# Patient Record
Sex: FEMALE | Race: WHITE
[De-identification: names, ages, dates, MRNs, and addresses within clinical notes are randomized per-mention and may not be internally consistent; named-entity substitution may affect disease eponyms.]

---

## 2019-07-11 NOTE — ULT
THYROID ULTRASOUND:

 

INDICATION: 

Followup nodules.

 

The patient is post right thyroidectomy.

 

FINDINGS: 

The left lobe is homogeneous.  There is an oblong-shaped relatively circumscribed isoechoic nodule mi
d to upper left lobe measuring 1.8 x 0.8 x 1.3 cm today.  This nodule has enlarged slightly since the
 prior exam at which time it measured approximately 0.6 x 1.4 cm.

 

In the mid to lower pole of the left lobe today is seen a slightly heterogeneous isoechoic nodule ion
suring 1.0 x 1.1 x 1.6 cm.  This nodule appears slightly different from the prior exam.  It was descr
ibed as cystic on the prior exam; however, a review of the prior study shows that the cyst was a comp
onent of this larger nodule which has not significantly changed since size but has changed in charact
er.  No significant cystic component is seen today.

 

Another tiny nodule measuring in the 6-8 mm range is identified in the medial lower pole.  

 

IMPRESSION: 

Slight enlargement of oblong-shaped nodule in the mid to upper pole of the left lobe.  A lower pole n
odule has changed in character today without significant change in size.  

 

POS: [facility]

## 2019-07-31 NOTE — ULT
Sonographic guided fine needle aspiration left thyroid lobe mass x2.



HISTORY: 2 dominant left thyroid lobe nodules.



FINDINGS: After explaining the procedure and answering all questions, sonographic survey confirms hyp
oechoic nodules within the left thyroid lobe.



Sterile technique, buffered local anesthesia, sonographic guidance, and a medial approach were used t
o carefully advance the tip of a 25-gauge needle into the oval mass at the superior pole of the

left thyroid lobe. Tissue was submitted to pathology for processing. A total of 4 passes were made.



A separate approach, also medial, was planned for the inferior pole nodule. Sterile technique, buffer
ed local anesthesia, sonographic guidance, and a medial approach were used to advance a 25-gauge

needle into the nodule at the inferior pole of the left thyroid lobe. Position was confirmed with son
ography. A total of 4 passes were made, with the tissue submitted to pathology for processing.



Postprocedure imaging shows no evidence of complication. Patient tolerated the procedure well and was
 dismissed in good condition.







IMPRESSION: Technically successful sonographic guided FNA sampling of 2 separate left thyroid lobe ma
sses. Pathology is pending.



Reported By: MELINDA Mcdowell 

Electronically Signed:  7/31/2019 4:45 PM

## 2021-10-18 ENCOUNTER — HOSPITAL ENCOUNTER (OUTPATIENT)
Dept: HOSPITAL 92 - BICMAMMO | Age: 46
Discharge: HOME | End: 2021-10-18
Attending: ADVANCED PRACTICE MIDWIFE
Payer: COMMERCIAL

## 2021-10-18 DIAGNOSIS — N63.15: Primary | ICD-10-CM

## 2021-10-18 DIAGNOSIS — N63.21: ICD-10-CM

## 2021-10-18 PROCEDURE — 77066 DX MAMMO INCL CAD BI: CPT

## 2021-10-18 PROCEDURE — G0279 TOMOSYNTHESIS, MAMMO: HCPCS

## 2022-02-24 ENCOUNTER — HOSPITAL ENCOUNTER (OUTPATIENT)
Dept: HOSPITAL 92 - TBSIIMAG | Age: 47
Discharge: HOME | End: 2022-02-24
Attending: FAMILY MEDICINE
Payer: COMMERCIAL

## 2022-02-24 DIAGNOSIS — H54.62: Primary | ICD-10-CM

## 2022-02-24 PROCEDURE — 70553 MRI BRAIN STEM W/O & W/DYE: CPT

## 2022-06-27 ENCOUNTER — HOSPITAL ENCOUNTER (EMERGENCY)
Dept: HOSPITAL 92 - ERS | Age: 47
Discharge: HOME | End: 2022-06-27
Payer: COMMERCIAL

## 2022-06-27 DIAGNOSIS — E78.5: ICD-10-CM

## 2022-06-27 DIAGNOSIS — S42.202A: ICD-10-CM

## 2022-06-27 DIAGNOSIS — I10: ICD-10-CM
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
## 2022-06-27 DIAGNOSIS — S42.212A: Primary | ICD-10-CM

## 2022-06-27 DIAGNOSIS — E03.9: ICD-10-CM

## 2022-06-27 PROCEDURE — 96374 THER/PROPH/DIAG INJ IV PUSH: CPT

## 2022-06-27 PROCEDURE — 96375 TX/PRO/DX INJ NEW DRUG ADDON: CPT

## 2022-09-08 ENCOUNTER — HOSPITAL ENCOUNTER (OUTPATIENT)
Dept: HOSPITAL 92 - ERS | Age: 47
Setting detail: OBSERVATION
LOS: 2 days | Discharge: HOME | End: 2022-09-10
Attending: FAMILY MEDICINE | Admitting: FAMILY MEDICINE
Payer: COMMERCIAL

## 2022-09-08 VITALS — BODY MASS INDEX: 27.8 KG/M2

## 2022-09-08 DIAGNOSIS — R77.8: ICD-10-CM

## 2022-09-08 DIAGNOSIS — Z20.822: ICD-10-CM

## 2022-09-08 DIAGNOSIS — R07.9: ICD-10-CM

## 2022-09-08 DIAGNOSIS — R00.0: ICD-10-CM

## 2022-09-08 DIAGNOSIS — E78.5: ICD-10-CM

## 2022-09-08 DIAGNOSIS — I10: ICD-10-CM

## 2022-09-08 DIAGNOSIS — E89.0: ICD-10-CM

## 2022-09-08 DIAGNOSIS — Z79.899: ICD-10-CM

## 2022-09-08 DIAGNOSIS — E83.42: ICD-10-CM

## 2022-09-08 DIAGNOSIS — E04.1: ICD-10-CM

## 2022-09-08 DIAGNOSIS — I08.8: ICD-10-CM

## 2022-09-08 DIAGNOSIS — R00.2: Primary | ICD-10-CM

## 2022-09-08 LAB
ALBUMIN SERPL BCG-MCNC: 4.7 G/DL (ref 3.5–5)
ALP SERPL-CCNC: 60 U/L (ref 40–110)
ALT SERPL W P-5'-P-CCNC: 21 U/L (ref 8–55)
ANION GAP SERPL CALC-SCNC: 14 MMOL/L (ref 10–20)
AST SERPL-CCNC: 16 U/L (ref 5–34)
BASOPHILS # BLD AUTO: 0 THOU/UL (ref 0–0.2)
BASOPHILS NFR BLD AUTO: 0.1 % (ref 0–1)
BILIRUB SERPL-MCNC: 0.4 MG/DL (ref 0.2–1.2)
BUN SERPL-MCNC: 13 MG/DL (ref 7–18.7)
CALCIUM SERPL-MCNC: 10.2 MG/DL (ref 7.8–10.44)
CHLORIDE SERPL-SCNC: 107 MMOL/L (ref 98–107)
CK MB SERPL-MCNC: 1 NG/ML (ref 0–6.6)
CO2 SERPL-SCNC: 21 MMOL/L (ref 22–29)
CREAT CL PREDICTED SERPL C-G-VRATE: 0 ML/MIN (ref 70–130)
EOSINOPHIL # BLD AUTO: 0.1 THOU/UL (ref 0–0.7)
EOSINOPHIL NFR BLD AUTO: 0.9 % (ref 0–10)
GLOBULIN SER CALC-MCNC: 3 G/DL (ref 2.4–3.5)
GLUCOSE SERPL-MCNC: 94 MG/DL (ref 70–105)
HGB BLD-MCNC: 14 G/DL (ref 12–16)
LIPASE SERPL-CCNC: 49 U/L (ref 8–78)
LYMPHOCYTES # BLD: 2.4 THOU/UL (ref 1.2–3.4)
LYMPHOCYTES NFR BLD AUTO: 26.2 % (ref 21–51)
MAGNESIUM SERPL-MCNC: 1.8 MG/DL (ref 1.6–2.6)
MCH RBC QN AUTO: 30.8 PG (ref 27–31)
MCV RBC AUTO: 91.4 FL (ref 78–98)
MONOCYTES # BLD AUTO: 0.5 THOU/UL (ref 0.11–0.59)
MONOCYTES NFR BLD AUTO: 5.8 % (ref 0–10)
NEUTROPHILS # BLD AUTO: 6.1 THOU/UL (ref 1.4–6.5)
NEUTROPHILS NFR BLD AUTO: 67 % (ref 42–75)
PLATELET # BLD AUTO: 225 THOU/UL (ref 130–400)
POTASSIUM SERPL-SCNC: 4.1 MMOL/L (ref 3.5–5.1)
PREGS CONTROL BACKGROUND?: (no result)
PREGS CONTROL BAR APPEAR?: YES
RBC # BLD AUTO: 4.54 MILL/UL (ref 4.2–5.4)
SODIUM SERPL-SCNC: 138 MMOL/L (ref 136–145)
TROPONIN I SERPL DL<=0.01 NG/ML-MCNC: 0.21 NG/ML (ref ?–0.03)
TROPONIN I SERPL DL<=0.01 NG/ML-MCNC: 0.23 NG/ML (ref ?–0.03)
WBC # BLD AUTO: 9.1 THOU/UL (ref 4.8–10.8)

## 2022-09-08 PROCEDURE — 84703 CHORIONIC GONADOTROPIN ASSAY: CPT

## 2022-09-08 PROCEDURE — 93005 ELECTROCARDIOGRAM TRACING: CPT

## 2022-09-08 PROCEDURE — 93010 ELECTROCARDIOGRAM REPORT: CPT

## 2022-09-08 PROCEDURE — 80053 COMPREHEN METABOLIC PANEL: CPT

## 2022-09-08 PROCEDURE — 78452 HT MUSCLE IMAGE SPECT MULT: CPT

## 2022-09-08 PROCEDURE — 71045 X-RAY EXAM CHEST 1 VIEW: CPT

## 2022-09-08 PROCEDURE — 84443 ASSAY THYROID STIM HORMONE: CPT

## 2022-09-08 PROCEDURE — 83735 ASSAY OF MAGNESIUM: CPT

## 2022-09-08 PROCEDURE — 93017 CV STRESS TEST TRACING ONLY: CPT

## 2022-09-08 PROCEDURE — 93306 TTE W/DOPPLER COMPLETE: CPT

## 2022-09-08 PROCEDURE — 71275 CT ANGIOGRAPHY CHEST: CPT

## 2022-09-08 PROCEDURE — 84484 ASSAY OF TROPONIN QUANT: CPT

## 2022-09-08 PROCEDURE — 36415 COLL VENOUS BLD VENIPUNCTURE: CPT

## 2022-09-08 PROCEDURE — 83690 ASSAY OF LIPASE: CPT

## 2022-09-08 PROCEDURE — 82553 CREATINE MB FRACTION: CPT

## 2022-09-08 PROCEDURE — 85025 COMPLETE CBC W/AUTO DIFF WBC: CPT

## 2022-09-08 PROCEDURE — A9500 TC99M SESTAMIBI: HCPCS

## 2022-09-09 LAB — MAGNESIUM SERPL-MCNC: 2.6 MG/DL (ref 1.6–2.6)

## 2022-09-09 RX ADMIN — Medication SCH ML: at 19:40

## 2022-09-09 RX ADMIN — ASPIRIN SCH MG: 81 TABLET ORAL at 09:26

## 2022-09-10 VITALS — TEMPERATURE: 97.7 F | DIASTOLIC BLOOD PRESSURE: 80 MMHG | SYSTOLIC BLOOD PRESSURE: 128 MMHG

## 2022-09-10 RX ADMIN — ASPIRIN SCH MG: 81 TABLET ORAL at 14:18

## 2022-09-10 RX ADMIN — Medication SCH ML: at 12:14

## 2022-09-15 ENCOUNTER — HOSPITAL ENCOUNTER (OUTPATIENT)
Dept: HOSPITAL 92 - SCSMRI | Age: 47
Discharge: HOME | End: 2022-09-15
Attending: PHYSICIAN ASSISTANT
Payer: COMMERCIAL

## 2022-09-15 DIAGNOSIS — S46.012D: Primary | ICD-10-CM

## 2022-10-26 ENCOUNTER — HOSPITAL ENCOUNTER (OUTPATIENT)
Dept: HOSPITAL 92 - BICMAMMO | Age: 47
Discharge: HOME | End: 2022-10-26
Attending: FAMILY MEDICINE
Payer: COMMERCIAL

## 2022-10-26 DIAGNOSIS — M85.851: ICD-10-CM

## 2022-10-26 DIAGNOSIS — M85.852: ICD-10-CM

## 2022-10-26 DIAGNOSIS — Z13.820: Primary | ICD-10-CM

## 2022-10-26 PROCEDURE — 77080 DXA BONE DENSITY AXIAL: CPT

## 2025-01-23 ENCOUNTER — HOSPITAL ENCOUNTER (OUTPATIENT)
Dept: HOSPITAL 92 - BICRAD | Age: 50
Discharge: HOME | End: 2025-01-23
Attending: FAMILY MEDICINE
Payer: COMMERCIAL

## 2025-01-23 DIAGNOSIS — M79.672: Primary | ICD-10-CM

## 2025-01-23 DIAGNOSIS — M77.32: ICD-10-CM
